# Patient Record
Sex: MALE | Race: WHITE | NOT HISPANIC OR LATINO | Employment: OTHER | URBAN - METROPOLITAN AREA
[De-identification: names, ages, dates, MRNs, and addresses within clinical notes are randomized per-mention and may not be internally consistent; named-entity substitution may affect disease eponyms.]

---

## 2020-04-17 ENCOUNTER — LAB REQUISITION (OUTPATIENT)
Dept: LAB | Facility: HOSPITAL | Age: 75
End: 2020-04-17

## 2020-04-17 DIAGNOSIS — Z00.00 ROUTINE GENERAL MEDICAL EXAMINATION AT A HEALTH CARE FACILITY: ICD-10-CM

## 2020-04-17 LAB
ALBUMIN SERPL-MCNC: 3.8 G/DL (ref 3.5–5.2)
ALBUMIN/GLOB SERPL: 1.1 G/DL
ALP SERPL-CCNC: 69 U/L (ref 39–117)
ALT SERPL W P-5'-P-CCNC: 10 U/L (ref 1–41)
ANION GAP SERPL CALCULATED.3IONS-SCNC: 14 MMOL/L (ref 5–15)
AST SERPL-CCNC: 13 U/L (ref 1–40)
BASOPHILS # BLD AUTO: 0.05 10*3/MM3 (ref 0–0.2)
BASOPHILS NFR BLD AUTO: 0.7 % (ref 0–1.5)
BILIRUB SERPL-MCNC: 1 MG/DL (ref 0.2–1.2)
BUN BLD-MCNC: 25 MG/DL (ref 8–23)
BUN/CREAT SERPL: 23.1 (ref 7–25)
CALCIUM SPEC-SCNC: 9.5 MG/DL (ref 8.6–10.5)
CHLORIDE SERPL-SCNC: 92 MMOL/L (ref 98–107)
CO2 SERPL-SCNC: 25 MMOL/L (ref 22–29)
CREAT BLD-MCNC: 1.08 MG/DL (ref 0.76–1.27)
DEPRECATED RDW RBC AUTO: 44.7 FL (ref 37–54)
EOSINOPHIL # BLD AUTO: 0.05 10*3/MM3 (ref 0–0.4)
EOSINOPHIL NFR BLD AUTO: 0.7 % (ref 0.3–6.2)
ERYTHROCYTE [DISTWIDTH] IN BLOOD BY AUTOMATED COUNT: 13.2 % (ref 12.3–15.4)
GFR SERPL CREATININE-BSD FRML MDRD: 67 ML/MIN/1.73
GFR SERPL CREATININE-BSD FRML MDRD: 81 ML/MIN/1.73
GLOBULIN UR ELPH-MCNC: 3.6 GM/DL
GLUCOSE BLD-MCNC: 175 MG/DL (ref 65–99)
HCT VFR BLD AUTO: 40.6 % (ref 37.5–51)
HGB BLD-MCNC: 12.9 G/DL (ref 13–17.7)
IMM GRANULOCYTES # BLD AUTO: 0.02 10*3/MM3 (ref 0–0.05)
IMM GRANULOCYTES NFR BLD AUTO: 0.3 % (ref 0–0.5)
LYMPHOCYTES # BLD AUTO: 1.08 10*3/MM3 (ref 0.7–3.1)
LYMPHOCYTES NFR BLD AUTO: 14.3 % (ref 19.6–45.3)
MCH RBC QN AUTO: 29.5 PG (ref 26.6–33)
MCHC RBC AUTO-ENTMCNC: 31.8 G/DL (ref 31.5–35.7)
MCV RBC AUTO: 92.9 FL (ref 79–97)
MONOCYTES # BLD AUTO: 0.65 10*3/MM3 (ref 0.1–0.9)
MONOCYTES NFR BLD AUTO: 8.6 % (ref 5–12)
NEUTROPHILS # BLD AUTO: 5.72 10*3/MM3 (ref 1.7–7)
NEUTROPHILS NFR BLD AUTO: 75.4 % (ref 42.7–76)
NRBC BLD AUTO-RTO: 0 /100 WBC (ref 0–0.2)
PLATELET # BLD AUTO: 294 10*3/MM3 (ref 140–450)
PMV BLD AUTO: 9.1 FL (ref 6–12)
POTASSIUM BLD-SCNC: 4.3 MMOL/L (ref 3.5–5.2)
PROT SERPL-MCNC: 7.4 G/DL (ref 6–8.5)
RBC # BLD AUTO: 4.37 10*6/MM3 (ref 4.14–5.8)
SODIUM BLD-SCNC: 131 MMOL/L (ref 136–145)
WBC NRBC COR # BLD: 7.57 10*3/MM3 (ref 3.4–10.8)

## 2020-04-17 PROCEDURE — 80053 COMPREHEN METABOLIC PANEL: CPT

## 2020-04-17 PROCEDURE — 85025 COMPLETE CBC W/AUTO DIFF WBC: CPT

## 2021-07-12 ENCOUNTER — APPOINTMENT (OUTPATIENT)
Dept: CT IMAGING | Facility: HOSPITAL | Age: 76
End: 2021-07-12

## 2021-07-12 ENCOUNTER — HOSPITAL ENCOUNTER (EMERGENCY)
Facility: HOSPITAL | Age: 76
Discharge: HOME | End: 2021-07-12
Attending: EMERGENCY MEDICINE | Admitting: EMERGENCY MEDICINE

## 2021-07-12 ENCOUNTER — APPOINTMENT (OUTPATIENT)
Dept: GENERAL RADIOLOGY | Facility: HOSPITAL | Age: 76
End: 2021-07-12

## 2021-07-12 VITALS
WEIGHT: 300 LBS | DIASTOLIC BLOOD PRESSURE: 55 MMHG | BODY MASS INDEX: 42 KG/M2 | OXYGEN SATURATION: 97 % | SYSTOLIC BLOOD PRESSURE: 133 MMHG | HEIGHT: 71 IN | TEMPERATURE: 98.6 F | HEART RATE: 70 BPM | RESPIRATION RATE: 18 BRPM

## 2021-07-12 DIAGNOSIS — R42 DIZZINESS: Primary | ICD-10-CM

## 2021-07-12 DIAGNOSIS — T07.XXXA ABRASION, MULTIPLE SITES: ICD-10-CM

## 2021-07-12 LAB
ALBUMIN SERPL-MCNC: 3.9 G/DL (ref 3.5–5.2)
ALBUMIN/GLOB SERPL: 1.3 G/DL
ALP SERPL-CCNC: 74 U/L (ref 39–117)
ALT SERPL W P-5'-P-CCNC: 6 U/L (ref 1–41)
ANION GAP SERPL CALCULATED.3IONS-SCNC: 11 MMOL/L (ref 5–15)
AST SERPL-CCNC: 16 U/L (ref 1–40)
BASOPHILS # BLD AUTO: 0.07 10*3/MM3 (ref 0–0.2)
BASOPHILS NFR BLD AUTO: 1.2 % (ref 0–1.5)
BILIRUB SERPL-MCNC: 1 MG/DL (ref 0–1.2)
BILIRUB UR QL STRIP: NEGATIVE
BUN SERPL-MCNC: 26 MG/DL (ref 8–23)
BUN/CREAT SERPL: 26 (ref 7–25)
CALCIUM SPEC-SCNC: 9.1 MG/DL (ref 8.6–10.5)
CHLORIDE SERPL-SCNC: 98 MMOL/L (ref 98–107)
CLARITY UR: CLEAR
CO2 SERPL-SCNC: 22 MMOL/L (ref 22–29)
COLOR UR: YELLOW
CREAT SERPL-MCNC: 1 MG/DL (ref 0.76–1.27)
DEPRECATED RDW RBC AUTO: 42.7 FL (ref 37–54)
EOSINOPHIL # BLD AUTO: 0.2 10*3/MM3 (ref 0–0.4)
EOSINOPHIL NFR BLD AUTO: 3.4 % (ref 0.3–6.2)
ERYTHROCYTE [DISTWIDTH] IN BLOOD BY AUTOMATED COUNT: 12.9 % (ref 12.3–15.4)
GFR SERPL CREATININE-BSD FRML MDRD: 73 ML/MIN/1.73
GLOBULIN UR ELPH-MCNC: 3.1 GM/DL
GLUCOSE SERPL-MCNC: 100 MG/DL (ref 65–99)
GLUCOSE UR STRIP-MCNC: NEGATIVE MG/DL
HCT VFR BLD AUTO: 43.4 % (ref 37.5–51)
HGB BLD-MCNC: 14.4 G/DL (ref 13–17.7)
HGB UR QL STRIP.AUTO: NEGATIVE
HOLD SPECIMEN: NORMAL
IMM GRANULOCYTES # BLD AUTO: 0.01 10*3/MM3 (ref 0–0.05)
IMM GRANULOCYTES NFR BLD AUTO: 0.2 % (ref 0–0.5)
KETONES UR QL STRIP: ABNORMAL
LEUKOCYTE ESTERASE UR QL STRIP.AUTO: NEGATIVE
LYMPHOCYTES # BLD AUTO: 1.48 10*3/MM3 (ref 0.7–3.1)
LYMPHOCYTES NFR BLD AUTO: 25.2 % (ref 19.6–45.3)
MAGNESIUM SERPL-MCNC: 2 MG/DL (ref 1.6–2.4)
MCH RBC QN AUTO: 30.4 PG (ref 26.6–33)
MCHC RBC AUTO-ENTMCNC: 33.2 G/DL (ref 31.5–35.7)
MCV RBC AUTO: 91.6 FL (ref 79–97)
MONOCYTES # BLD AUTO: 0.94 10*3/MM3 (ref 0.1–0.9)
MONOCYTES NFR BLD AUTO: 16 % (ref 5–12)
NEUTROPHILS NFR BLD AUTO: 3.17 10*3/MM3 (ref 1.7–7)
NEUTROPHILS NFR BLD AUTO: 54 % (ref 42.7–76)
NITRITE UR QL STRIP: NEGATIVE
NRBC BLD AUTO-RTO: 0 /100 WBC (ref 0–0.2)
PH UR STRIP.AUTO: <=5 [PH] (ref 5–8)
PLATELET # BLD AUTO: 183 10*3/MM3 (ref 140–450)
PMV BLD AUTO: 9.4 FL (ref 6–12)
POTASSIUM SERPL-SCNC: 4.1 MMOL/L (ref 3.5–5.2)
PROT SERPL-MCNC: 7 G/DL (ref 6–8.5)
PROT UR QL STRIP: NEGATIVE
RBC # BLD AUTO: 4.74 10*6/MM3 (ref 4.14–5.8)
SODIUM SERPL-SCNC: 131 MMOL/L (ref 136–145)
SP GR UR STRIP: 1.02 (ref 1–1.03)
TROPONIN T SERPL-MCNC: <0.01 NG/ML (ref 0–0.03)
UROBILINOGEN UR QL STRIP: ABNORMAL
WBC # BLD AUTO: 5.87 10*3/MM3 (ref 3.4–10.8)
WHOLE BLOOD HOLD SPECIMEN: NORMAL

## 2021-07-12 PROCEDURE — 71045 X-RAY EXAM CHEST 1 VIEW: CPT

## 2021-07-12 PROCEDURE — 93005 ELECTROCARDIOGRAM TRACING: CPT

## 2021-07-12 PROCEDURE — 93005 ELECTROCARDIOGRAM TRACING: CPT | Performed by: EMERGENCY MEDICINE

## 2021-07-12 PROCEDURE — 99284 EMERGENCY DEPT VISIT MOD MDM: CPT

## 2021-07-12 PROCEDURE — 83735 ASSAY OF MAGNESIUM: CPT | Performed by: EMERGENCY MEDICINE

## 2021-07-12 PROCEDURE — 84484 ASSAY OF TROPONIN QUANT: CPT | Performed by: EMERGENCY MEDICINE

## 2021-07-12 PROCEDURE — 80053 COMPREHEN METABOLIC PANEL: CPT | Performed by: EMERGENCY MEDICINE

## 2021-07-12 PROCEDURE — 70450 CT HEAD/BRAIN W/O DYE: CPT

## 2021-07-12 PROCEDURE — 85025 COMPLETE CBC W/AUTO DIFF WBC: CPT | Performed by: EMERGENCY MEDICINE

## 2021-07-12 PROCEDURE — 81003 URINALYSIS AUTO W/O SCOPE: CPT | Performed by: EMERGENCY MEDICINE

## 2021-07-12 RX ORDER — SODIUM CHLORIDE 0.9 % (FLUSH) 0.9 %
10 SYRINGE (ML) INJECTION AS NEEDED
Status: DISCONTINUED | OUTPATIENT
Start: 2021-07-12 | End: 2021-07-12 | Stop reason: HOSPADM

## 2021-07-12 NOTE — ED NOTES
Report to [place] at [facility], [facility] ETA 2015     Bren De La Rosa, RN  07/12/21 1948

## 2021-07-13 LAB
QT INTERVAL: 394 MS
QTC INTERVAL: 445 MS

## 2021-07-13 NOTE — ED PROVIDER NOTES
"Subjective   Pt presents after a fall.  He lives at NH and was walking, his hand slipped off a railing and he fell over.  He has some abrasions to the right elbow and right knee but he denies any pain.  He specifically denies pain to the head, neck, back, arms, legs.  He says he did not want to come but the NH made him come.    He has been having \"balance issues\" and uses a walker at the NH.  He has been getting PT for the issue.    He denies any recent medication changes.  He has a history of dementia, Parkinson's disease and AF and is on Eliquis.      History provided by:  Patient      Review of Systems   Constitutional: Negative for fever.   Respiratory: Negative for cough and shortness of breath.    Cardiovascular: Negative for chest pain.   Gastrointestinal: Negative for abdominal pain.   Musculoskeletal: Negative for back pain and neck pain.   Neurological: Negative for headaches.   All other systems reviewed and are negative.      Past Medical History:   Diagnosis Date   • Dementia (CMS/[facility])    • Parkinson disease (CMS/[facility])        No Known Allergies    No past surgical history on file.    No family history on file.    Social History     Socioeconomic History   • Marital status:      Spouse name: Not on file   • Number of children: Not on file   • Years of education: Not on file   • Highest education level: Not on file   Tobacco Use   • Smoking status: Never Smoker   Substance and Sexual Activity   • Alcohol use: Not Currently   • Drug use: Not Currently           Objective   Physical Exam  Vitals and nursing note reviewed.   Constitutional:       General: He is not in acute distress.     Appearance: Normal appearance. He is not ill-appearing.   HENT:      Head: Normocephalic and atraumatic.      Mouth/Throat:      Mouth: Mucous membranes are moist.   Eyes:      General: No scleral icterus.        Right eye: No discharge.         Left eye: No discharge.      Conjunctiva/sclera: Conjunctivae normal. "   Cardiovascular:      Rate and Rhythm: Normal rate and regular rhythm.      Heart sounds: No murmur heard.     Pulmonary:      Effort: Pulmonary effort is normal. No respiratory distress.      Breath sounds: Normal breath sounds. No wheezing.   Abdominal:      General: Bowel sounds are normal. There is no distension.      Palpations: Abdomen is soft.      Tenderness: There is no abdominal tenderness. There is no guarding or rebound.   Musculoskeletal:         General: No swelling. Normal range of motion.      Cervical back: Normal range of motion and neck supple.      Comments: Abrasion to the right elbow. Normal ROM, no bruising, no laceration, no tenderness or swelling or deformity.    Abrasion to the right knee below patella.  Normal ROM, no swelling, no deformity, no tenderness.    Except as documented there is no tenderness to gross palpation of the arms or legs, and intact painless ROM to the shoulders, elbows, wrists, hips, knees and ankles.     Skin:     General: Skin is warm and dry.      Findings: No rash.   Neurological:      General: No focal deficit present.      Mental Status: He is alert and oriented to person, place, and time. Mental status is at baseline.      Comments: Speech fluent and articulate.  No facial droop.  5/5 strength in arms and legs.  Normal .  Mentation normal.   Psychiatric:         Mood and Affect: Mood normal.         Behavior: Behavior normal.         Thought Content: Thought content normal.         Procedures           ED Course         Chart says he has dementia but he gives a detailed history quite readily, recites facts and dates and talks about his stuation and his family.  If he is confabulating he is fantastic at it.    EKG AF with trigeminy.  Labs benign.  Imaging negative.  Continues to have no complaints.  Patient stable on serial rechecks.  Discussed findings, concerns, plan of care, expected course, reasons to return and followup.  Provided the opportunity to  ask questions.                                    MDM  Number of Diagnoses or Management Options     Amount and/or Complexity of Data Reviewed  Clinical lab tests: ordered and reviewed  Tests in the radiology section of CPT®: ordered and reviewed  Independent visualization of images, tracings, or specimens: yes        Final diagnoses:   Dizziness   Abrasion, multiple sites       ED Disposition  ED Disposition     ED Disposition Condition Comment    Discharge Stable           Xavi Perez MD  [address] [place] [address] DR JONES [address] [place] [address]  [phone]    In 2 days           Medication List      No changes were made to your prescriptions during this visit.          Yaya Calle MD  07/13/21 0040

## 2021-07-13 NOTE — ED NOTES
Pt assisted with urinal; Report to [facility] transport team.     Bren De La Rosa RN  07/12/21 2016

## 2021-08-21 ENCOUNTER — APPOINTMENT (OUTPATIENT)
Dept: MRI IMAGING | Facility: HOSPITAL | Age: 76
End: 2021-08-21

## 2021-08-21 ENCOUNTER — APPOINTMENT (OUTPATIENT)
Dept: GENERAL RADIOLOGY | Facility: HOSPITAL | Age: 76
End: 2021-08-21

## 2021-08-21 ENCOUNTER — APPOINTMENT (OUTPATIENT)
Dept: CT IMAGING | Facility: HOSPITAL | Age: 76
End: 2021-08-21

## 2021-08-21 ENCOUNTER — HOSPITAL ENCOUNTER (EMERGENCY)
Facility: HOSPITAL | Age: 76
Discharge: NURSING, REHAB OR LONG-TERM CARE | End: 2021-08-21
Attending: EMERGENCY MEDICINE | Admitting: EMERGENCY MEDICINE

## 2021-08-21 VITALS
WEIGHT: 315 LBS | HEIGHT: 71 IN | SYSTOLIC BLOOD PRESSURE: 126 MMHG | OXYGEN SATURATION: 95 % | DIASTOLIC BLOOD PRESSURE: 83 MMHG | TEMPERATURE: 97.4 F | HEART RATE: 73 BPM | RESPIRATION RATE: 18 BRPM | BODY MASS INDEX: 44.1 KG/M2

## 2021-08-21 DIAGNOSIS — S09.90XA INJURY OF HEAD, INITIAL ENCOUNTER: ICD-10-CM

## 2021-08-21 DIAGNOSIS — F03.90 DEMENTIA WITHOUT BEHAVIORAL DISTURBANCE, UNSPECIFIED DEMENTIA TYPE: ICD-10-CM

## 2021-08-21 DIAGNOSIS — Z79.01 ANTICOAGULATED: ICD-10-CM
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
## 2021-08-21 DIAGNOSIS — R41.82 ALTERED MENTAL STATUS, UNSPECIFIED ALTERED MENTAL STATUS TYPE: Primary | ICD-10-CM

## 2021-08-21 LAB
ALT SERPL W P-5'-P-CCNC: 5 U/L (ref 1–41)
APTT PPP: 40.1 SECONDS (ref 50–95)
AST SERPL-CCNC: 20 U/L (ref 1–40)
BASE EXCESS BLDA CALC-SCNC: 0 MMOL/L (ref -5–5)
BASOPHILS # BLD AUTO: 0.01 10*3/MM3 (ref 0–0.2)
BASOPHILS NFR BLD AUTO: 0.2 % (ref 0–1.5)
BILIRUB UR QL STRIP: NEGATIVE
CA-I BLDA-SCNC: 1.16 MMOL/L (ref 1.2–1.32)
CLARITY UR: CLEAR
CO2 BLDA-SCNC: 26 MMOL/L (ref 24–29)
COLOR UR: ABNORMAL
CREAT BLDA-MCNC: 0.8 MG/DL (ref 0.6–1.3)
DEPRECATED RDW RBC AUTO: 42.5 FL (ref 37–54)
EOSINOPHIL # BLD AUTO: 0.04 10*3/MM3 (ref 0–0.4)
EOSINOPHIL NFR BLD AUTO: 0.8 % (ref 0.3–6.2)
ERYTHROCYTE [DISTWIDTH] IN BLOOD BY AUTOMATED COUNT: 13 % (ref 12.3–15.4)
GLUCOSE BLDC GLUCOMTR-MCNC: 124 MG/DL (ref 70–130)
GLUCOSE UR STRIP-MCNC: NEGATIVE MG/DL
HCO3 BLDA-SCNC: 24.6 MMOL/L (ref 22–26)
HCT VFR BLD AUTO: 42.2 % (ref 37.5–51)
HCT VFR BLDA CALC: 41 % (ref 38–51)
HGB BLD-MCNC: 14.1 G/DL (ref 13–17.7)
HGB BLDA-MCNC: 13.9 G/DL (ref 12–17)
HGB UR QL STRIP.AUTO: NEGATIVE
HOLD SPECIMEN: NORMAL
IMM GRANULOCYTES # BLD AUTO: 0.01 10*3/MM3 (ref 0–0.05)
IMM GRANULOCYTES NFR BLD AUTO: 0.2 % (ref 0–0.5)
INR PPP: 1.6 (ref 0.8–1.2)
KETONES UR QL STRIP: NEGATIVE
LEUKOCYTE ESTERASE UR QL STRIP.AUTO: NEGATIVE
LYMPHOCYTES # BLD AUTO: 0.89 10*3/MM3 (ref 0.7–3.1)
LYMPHOCYTES NFR BLD AUTO: 18.7 % (ref 19.6–45.3)
MCH RBC QN AUTO: 30 PG (ref 26.6–33)
MCHC RBC AUTO-ENTMCNC: 33.4 G/DL (ref 31.5–35.7)
MCV RBC AUTO: 89.8 FL (ref 79–97)
MONOCYTES # BLD AUTO: 0.58 10*3/MM3 (ref 0.1–0.9)
MONOCYTES NFR BLD AUTO: 12.2 % (ref 5–12)
NEUTROPHILS NFR BLD AUTO: 3.22 10*3/MM3 (ref 1.7–7)
NEUTROPHILS NFR BLD AUTO: 67.9 % (ref 42.7–76)
NITRITE UR QL STRIP: NEGATIVE
NRBC BLD AUTO-RTO: 0 /100 WBC (ref 0–0.2)
PCO2 BLDA: 37.5 MM HG (ref 35–45)
PH BLDA: 7.42 PH UNITS (ref 7.35–7.6)
PH UR STRIP.AUTO: <=5 [PH] (ref 5–8)
PLATELET # BLD AUTO: 153 10*3/MM3 (ref 140–450)
PMV BLD AUTO: 9.5 FL (ref 6–12)
PO2 BLDA: 36 MMHG (ref 80–105)
POTASSIUM BLDA-SCNC: 4 MMOL/L (ref 3.5–4.9)
PROT UR QL STRIP: ABNORMAL
PROTHROMBIN TIME: 18.7 SECONDS (ref 12.8–15.2)
QT INTERVAL: 420 MS
QTC INTERVAL: 440 MS
RBC # BLD AUTO: 4.7 10*6/MM3 (ref 4.14–5.8)
SAO2 % BLDA: 70 % (ref 95–98)
SODIUM BLD-SCNC: 138 MMOL/L (ref 138–146)
SP GR UR STRIP: 1.08 (ref 1–1.03)
TROPONIN T SERPL-MCNC: <0.01 NG/ML (ref 0–0.03)
UROBILINOGEN UR QL STRIP: ABNORMAL
WBC # BLD AUTO: 4.75 10*3/MM3 (ref 3.4–10.8)
WHOLE BLOOD HOLD SPECIMEN: NORMAL

## 2021-08-21 PROCEDURE — 70551 MRI BRAIN STEM W/O DYE: CPT

## 2021-08-21 PROCEDURE — 85730 THROMBOPLASTIN TIME PARTIAL: CPT | Performed by: EMERGENCY MEDICINE

## 2021-08-21 PROCEDURE — 99284 EMERGENCY DEPT VISIT MOD MDM: CPT

## 2021-08-21 PROCEDURE — 85025 COMPLETE CBC W/AUTO DIFF WBC: CPT | Performed by: EMERGENCY MEDICINE

## 2021-08-21 PROCEDURE — 93005 ELECTROCARDIOGRAM TRACING: CPT | Performed by: EMERGENCY MEDICINE

## 2021-08-21 PROCEDURE — 0042T HC CT CEREBRAL PERFUSION W/WO CONTRAST: CPT

## 2021-08-21 PROCEDURE — 85014 HEMATOCRIT: CPT

## 2021-08-21 PROCEDURE — 70496 CT ANGIOGRAPHY HEAD: CPT

## 2021-08-21 PROCEDURE — 70498 CT ANGIOGRAPHY NECK: CPT

## 2021-08-21 PROCEDURE — 70450 CT HEAD/BRAIN W/O DYE: CPT

## 2021-08-21 PROCEDURE — 84132 ASSAY OF SERUM POTASSIUM: CPT

## 2021-08-21 PROCEDURE — 71045 X-RAY EXAM CHEST 1 VIEW: CPT

## 2021-08-21 PROCEDURE — 82330 ASSAY OF CALCIUM: CPT

## 2021-08-21 PROCEDURE — 84295 ASSAY OF SERUM SODIUM: CPT

## 2021-08-21 PROCEDURE — 99285 EMERGENCY DEPT VISIT HI MDM: CPT

## 2021-08-21 PROCEDURE — 84460 ALANINE AMINO (ALT) (SGPT): CPT | Performed by: EMERGENCY MEDICINE

## 2021-08-21 PROCEDURE — 82565 ASSAY OF CREATININE: CPT

## 2021-08-21 PROCEDURE — 82947 ASSAY GLUCOSE BLOOD QUANT: CPT

## 2021-08-21 PROCEDURE — 84484 ASSAY OF TROPONIN QUANT: CPT | Performed by: EMERGENCY MEDICINE

## 2021-08-21 PROCEDURE — 81003 URINALYSIS AUTO W/O SCOPE: CPT | Performed by: EMERGENCY MEDICINE

## 2021-08-21 PROCEDURE — 82803 BLOOD GASES ANY COMBINATION: CPT

## 2021-08-21 PROCEDURE — 85610 PROTHROMBIN TIME: CPT

## 2021-08-21 PROCEDURE — 84450 TRANSFERASE (AST) (SGOT): CPT | Performed by: EMERGENCY MEDICINE

## 2021-08-21 PROCEDURE — 0 IOPAMIDOL PER 1 ML: Performed by: EMERGENCY MEDICINE

## 2021-08-21 RX ORDER — MEMANTINE HYDROCHLORIDE 10 MG/1
10 TABLET ORAL 2 TIMES DAILY
COMMUNITY

## 2021-08-21 RX ORDER — METOPROLOL SUCCINATE 25 MG/1
25 TABLET, EXTENDED RELEASE ORAL DAILY
COMMUNITY

## 2021-08-21 RX ORDER — CHOLECALCIFEROL (VITAMIN D3) 125 MCG
5 CAPSULE ORAL
COMMUNITY

## 2021-08-21 RX ORDER — ESCITALOPRAM OXALATE 5 MG/1
5 TABLET ORAL DAILY
COMMUNITY

## 2021-08-21 RX ORDER — DOCUSATE SODIUM 100 MG/1
100 CAPSULE, LIQUID FILLED ORAL 2 TIMES DAILY
COMMUNITY

## 2021-08-21 RX ORDER — ASPIRIN 81 MG/1
81 TABLET, CHEWABLE ORAL DAILY
COMMUNITY

## 2021-08-21 RX ORDER — SODIUM CHLORIDE 0.9 % (FLUSH) 0.9 %
10 SYRINGE (ML) INJECTION AS NEEDED
Status: DISCONTINUED | OUTPATIENT
Start: 2021-08-21 | End: 2021-08-21 | Stop reason: HOSPADM

## 2021-08-21 RX ORDER — BISACODYL 10 MG
10 SUPPOSITORY, RECTAL RECTAL DAILY
COMMUNITY

## 2021-08-21 RX ORDER — ACETAMINOPHEN 325 MG/1
650 TABLET ORAL EVERY 6 HOURS PRN
COMMUNITY

## 2021-08-21 RX ORDER — LOVASTATIN 20 MG/1
20 TABLET ORAL NIGHTLY
COMMUNITY

## 2021-08-21 RX ORDER — FINASTERIDE 5 MG/1
5 TABLET, FILM COATED ORAL DAILY
COMMUNITY

## 2021-08-21 RX ADMIN — IOPAMIDOL 115 ML: 755 INJECTION, SOLUTION INTRAVENOUS at 11:20

## 2021-08-21 NOTE — CONSULTS
Stroke Consult Note    Patient Name: Kalpesh Orozco   MRN: [id number]  Age: 76 y.o.  Sex: male  : 1945    Primary Care Physician: Xavi Perez MD  Referring Physician: Dr. Boston Lara    TIME STROKE TEAM CALLED: 1052 EST     TIME PATIENT SEEN: 1103 EST    Handedness: Right  Race:     Chief Complaint/Reason for Consultation: Increased confusion, fall, and dysarthria    Subjective .  HPI: Mr. Orozco is a 76-year-old right-handed  male with known medical diagnosis of hypertension, hyperlipidemia, Lewy body dementia, Parkinson's disease, atrial fibrillation (on chronic Eliquis), anxiety/depression, and obstructive sleep apnea who presents to our facility via EMS from [facility] after sustaining a fall this morning.  Staff reports that they noticed the patient was increasingly confused yesterday however his significant other did not want him sent to the hospital for further evaluation.  When he sustained his fall today they contacted her and she wanted him transferred to our facility for further evaluation.  At baseline staff says that he is independent and can walk with or without a walking device, is able to dress himself, and feed himself.  They state that he is typically able to answer questions appropriately however was unable to do so this morning.  EMS states that the patient's blood pressure upon arrival was 96/50 and in route was 100/70.  In the emergency department his blood pressure is currently 116/73.    Last Known Normal Date/Time: 2021 unknown onset EST     Review of Systems   Unable to perform ROS: Mental status change      Past Medical History:   Diagnosis Date   • Anxiety    • Atrial fibrillation (CMS/HCC)    • Dementia (CMS/HCC)    • Depression    • GERD (gastroesophageal reflux disease)    • Hyperlipidemia    • Hypertension    • Lewy body dementia (CMS/HCC)    • JOHANN (obstructive sleep apnea)    • Parkinson disease (CMS/HCC)      No past surgical history  on file.  No family history on file.  Social History     Socioeconomic History   • Marital status:      Spouse name: Not on file   • Number of children: Not on file   • Years of education: Not on file   • Highest education level: Not on file   Tobacco Use   • Smoking status: Never Smoker   Substance and Sexual Activity   • Alcohol use: Not Currently   • Drug use: Not Currently     No Known Allergies  Prior to Admission medications    Medication Sig Start Date End Date Taking? Authorizing Provider   acetaminophen (TYLENOL) 325 MG tablet Take 650 mg by mouth Every 6 (Six) Hours As Needed for Mild Pain .   Yes Provider, MD Kristin         Objective     Temp:  [97.4 °F (36.3 °C)] 97.4 °F (36.3 °C)  Heart Rate:  [69-74] 73  Resp:  [18] 18  BP: (116-131)/(66-90) 122/66  Neurological Exam  Mental Status  Alert. Oriented only to person and place. Mild dysarthria present. Language is fluent with no aphasia. Attention and concentration are normal.    Cranial Nerves  CN II: Visual fields full to confrontation. Difficult to assess secondary to patient's altered mental status however patient does blink to visual threat in all fields.  CN III, IV, VI: Extraocular movements intact bilaterally. Pupils equal round and reactive to light bilaterally.  CN V: Facial sensation is normal.  CN VII: Full and symmetric facial movement.  CN VIII: Hearing intact bilaterally.  CN IX, X: Palate elevates symmetrically  CN XI: Shoulder shrug strength is normal.  CN XII: Tongue midline without atrophy or fasciculations.    Motor  Normal muscle bulk throughout. No fasciculations present. Normal muscle tone.  Mild bilateral lower extremity weakness (L>R) with 4/5 strength.  Lateral upper extremities without drift and 5/5 strength..    Sensory  Light touch is normal in upper and lower extremities.     Coordination  No obvious dysmetria.    Gait  Not assessed.      Physical Exam  Constitutional:       General: He is not in acute distress.      Appearance: Normal appearance. He is not ill-appearing.   HENT:      Head: Normocephalic.      Mouth/Throat:      Mouth: Mucous membranes are moist.      Pharynx: Oropharynx is clear.   Eyes:      Extraocular Movements: Extraocular movements intact.      Conjunctiva/sclera: Conjunctivae normal.      Pupils: Pupils are equal, round, and reactive to light.   Cardiovascular:      Rate and Rhythm: Normal rate and regular rhythm.   Pulmonary:      Effort: Pulmonary effort is normal.      Comments: On room air  Skin:     General: Skin is warm and dry.   Neurological:      Mental Status: He is alert. He is disoriented.      Cranial Nerves: Dysarthria present. No cranial nerve deficit.      Sensory: No sensory deficit.      Motor: Weakness present.      Coordination: Coordination normal.   Psychiatric:         Mood and Affect: Mood normal.         Behavior: Behavior normal.       Acute Stroke Data    Alteplase (tPA) Inclusion / Exclusion Criteria    Time: 15:06 EDT  Person Administering Scale: SUKI Mc    Inclusion Criteria  [x]   18 years of age or greater   []   Onset of symptoms < 4.5 hours before beginning treatment (stroke onset = time patient was last seen well or without symptoms).   []   Diagnosis of acute ischemic stroke causing measurable disabling deficit (Complete Hemianopia, Any Aphasia, Visual or Sensory Extinction, Any weakness limiting sustained effort against gravity)   []   Any remaining deficit considered potentially disabling in view of patient and practitioner   Exclusion criteria (Do not proceed with Alteplase if any are checked under exclusion criteria)  [x]   Onset unknown or GREATER than 4.5 hours   []   ICH on CT/MRI   []   CT demonstrates hypodensity representing acute or subacute infarct   []   Significant head trauma or prior stroke in the previous 3 months   []   Symptoms suggestive of subarachnoid hemorrhage   []   History of un-ruptured intracranial aneurysm GREATER than  10 mm   []   Recent intracranial or intraspinal surgery within the last 3 months   []   Arterial puncture at a non-compressible site in the previous 7 days   []   Active internal bleeding   []   Acute bleeding tendency   []   Platelet count LESS than 100,000 for known hematological diseases such as leukemia, thrombocytopenia or chronic cirrhosis   [x]   Current use of anticoagulant with INR GREATER than 1.7 or PT GREATER than 15 seconds, aPTT GREATER than 40 seconds   []   Heparin received within 48 hours, resulting in abnormally elevated aPTT GREATER than upper limit of normal   []   Current use of direct thrombin inhibitors or direct factor Xa inhibitors in the past 48 hours   []   Elevated blood pressure refractory to treatment (systolic GREATER than 185 mm/Hg or diastolic  GREATER than 110 mm/Hg   []   Suspected infective endocarditis and aortic arch dissection   []   Current use of therapeutic treatment dose of low-molecular-weight heparin (LMWH) within the previous 24 hours   []   Structural GI malignancy or bleed   Relative exclusion for all patients  []   Only minor non-disabling symptoms   []   Pregnancy   []   Seizure at onset with postictal residual neurological impairments   []   Major surgery or previous trauma within past 14 days   []   History of previous spontaneous ICH, intracranial neoplasm, or AV malformation   []   Postpartum (within previous 14 days)   []   Recent GI or urinary tract hemorrhage (within previous 21 days)   []   Recent acute MI (within previous 3 months)   []   History of un-ruptured intracranial aneurysm LESS than 10 mm   []   History of ruptured intracranial aneurysm   []   Blood glucose LESS than 50 mg/dL (2.7 mmol/L)   []   Dural puncture within the last 7 days   []   Known GREATER than 10 cerebral microbleeds   Additional exclusions for patients with symptoms onset between 3 and 4.5 hours.  []   Age > 80.   []   On any anticoagulants regardless of INR  >>> Warfarin  (Coumadin), Heparin, Enoxaparin (Lovenox), fondaparinux (Arixtra), bivalirudin (Angiomax), Argatroban, dabigatran (Pradaxa), rivaroxaban (Xarelto), or apixaban (Eliquis)   []   Severe stroke (NIHSS > 25).   []   History of BOTH diabetes and previous ischemic stroke.   []   The risks and benefits have been discussed with the patient or family related to the administration of IV Alteplase for stroke symptoms.   []   I have discussed and reviewed the patient's case and imaging with the attending prior to IV Alteplase.   N/A Time Alteplase administered       Hospital Meds:  Scheduled-    Infusions-     PRNs- •  sodium chloride    Functional Status Prior to Current Stroke/Iota Score: 3-4    NIH Stroke Scale  Time: 15:06 EDT  Person Administering Scale: SUKI Mc    Interval: baseline  1a. Level of Consciousness: 0-->Alert, keenly responsive  1b. LOC Questions: 2-->Answers neither question correctly  1c. LOC Commands: 0-->Performs both tasks correctly  2. Best Gaze: 0-->Normal  3. Visual: 0-->No visual loss  4. Facial Palsy: 0-->Normal symmetrical movements  5a. Motor Arm, Left: 0-->No drift, limb holds 90 (or 45) degrees for full 10 secs  5b. Motor Arm, Right: 0-->No drift, limb holds 90 (or 45) degrees for full 10 secs  6a. Motor Leg, Left: 0-->No drift, leg holds 30 degree position for full 5 secs  6b. Motor Leg, Right: 0-->No drift, leg holds 30 degree position for full 5 secs  7. Limb Ataxia: 0-->Absent  8. Sensory: 0-->Normal, no sensory loss  9. Best Language: 0-->No aphasia, normal  10. Dysarthria: 1-->Mild-to-moderate dysarthria, patient slurs at least some words and, at worst, can be understood with some difficulty  11. Extinction and Inattention (formerly Neglect): 0-->No abnormality    Total (NIH Stroke Scale): 3    Results Reviewed:  I have personally reviewed current lab, radiology, and data and agree with results.    CT of the head without contrast is negative for hemorrhage and/or  acute process.    CTA head/neck is negative for flow-limiting stenosis or large vessel occlusion.  The patient does have some mild bilateral carotid atherosclerosis as well as intracranial atherosclerosis.    CT perfusion is negative for large vessel occlusive stroke.        Assessment/Plan:      Altered mental status  Fall with trauma to head  Atrial fibrillation  Lewy body dementia  Parkinson's disease  Hypertension  Hyperlipidemia    This is a 76-year-old right-handed  male presented to the emergency department via EMS after sustaining a fall with trauma to his head.  Nursing staff notes that the patient has been increasingly confused since yesterday afternoon however family initially refused transfer to the hospital.  After his fall today they noted that he had dysarthria prompting them to call EMS to bring him to our facility for further evaluation.    Patient was examined with Dr. Lara in the CT scanner.  Discussed patient and exam with Dr. Tamez.  We will obtain MRI of the brain without contrast to further evaluate for stroke.  If this is negative the patient can be sent back to his nursing facility today.  Discussed recommendations with Dr. Lara who is in agreement.    Dr. Lara has also ordered routine labs as well as urinalysis to further evaluate for potential causes for the patient's increased confusion.    Will follow-up on MRI results.    Discussed plan of care with patient's spouse via telephone who is in agreement with plan.  She has no further questions at this time.    Alma Israel, SUKI  August 21, 2021  11:35 EDT    Addendum 1442: MRI of the brain without contrast was reviewed.  There is no evidence of acute infarct.

## 2021-08-21 NOTE — DISCHARGE INSTRUCTIONS
Increase fluid intake.    Fall precautions.    Return if worse.    Please review the medications you are supposed to be taking according to prior physician recommendations. I have not changed your home medications during this visit. If your discharge instructions indicate that I have changed your home medications, this is not the case, and you should disregard. If you have any questions about the medication you should be taking at home, please call your physician.

## 2021-08-21 NOTE — ED PROVIDER NOTES
EMERGENCY DEPARTMENT ENCOUNTER    Pt Name: Kalpesh Orozco  MRN: [id number]  Pt :   1945  Room Number:    Date of encounter:  2021  PCP: Xavi Perez MD  ED Provider: Boston Lara MD    Historian: Paramedics      HPI:  Chief Complaint: Altered mental status, slurred speech        Context: Kalpesh Orozco is a 76 y.o. male who presents to the ED c/o increased confusion and slurred speech with fall and head injury this morning.  Starting yesterday at some point the patient has been somewhat more confused than at baseline.  Today the patient had a fall to the floor.  Paramedics report this was unwitnessed but no loss of consciousness was visualized by staff members.  The patient speech has been somewhat more slurred than at baseline.  His confusion seems to wax and wane.  As far as they know he has had no fevers or chills.  The patient is a very poor historian and unable to give a detailed account of recent events.        PAST MEDICAL HISTORY  Past Medical History:   Diagnosis Date   • Anxiety    • Atrial fibrillation (CMS/HCC)    • Dementia (CMS/HCC)    • Depression    • GERD (gastroesophageal reflux disease)    • Hyperlipidemia    • Hypertension    • Lewy body dementia (CMS/HCC)    • JOHANN (obstructive sleep apnea)    • Parkinson disease (CMS/HCC)          PAST SURGICAL HISTORY  No past surgical history on file.      FAMILY HISTORY  No family history on file.      SOCIAL HISTORY  Social History     Socioeconomic History   • Marital status:      Spouse name: Not on file   • Number of children: Not on file   • Years of education: Not on file   • Highest education level: Not on file   Tobacco Use   • Smoking status: Never Smoker   Substance and Sexual Activity   • Alcohol use: Not Currently   • Drug use: Not Currently         ALLERGIES  Patient has no known allergies.        REVIEW OF SYSTEMS  Review of Systems       Unable secondary to altered mental status.      PHYSICAL EXAM    I have reviewed  the triage vital signs and nursing notes.    ED Triage Vitals   Temp Pulse Resp BP SpO2   -- -- -- -- --      Temp src Heart Rate Source Patient Position BP Location FiO2 (%)   -- -- -- -- --       Physical Exam  GENERAL:   Appears somewhat agitated but nontoxic  HENT: Nares patent.  EYES: No scleral icterus.  CV: Regular rhythm, regular rate.  No murmurs gallops rubs  RESPIRATORY: Normal effort.  No audible wheezes, rales or rhonchi.  Clear to auscultation no definite evidence of craniofacial trauma.  ABDOMEN: Soft, nontender  MUSCULOSKELETAL: No deformities.   NEURO: Alert, moves all extremities, follows commands but is inconsistent with following these commands.  Visual fields are inconsistent as well.  Patient is confused about at least some of our testing.  See stroke navigator note for formal NIH but note that the patient is nonlateralizing.  SKIN: Warm, dry, no rash visualized.        LAB RESULTS  Recent Results (from the past 24 hour(s))   POC Surgery Labs    Collection Time: 08/21/21 11:11 AM    Specimen: Blood   Result Value Ref Range    Ionized Calcium 1.16 (L) 1.20 - 1.32 mmol/L    POC Potassium 4.0 3.5 - 4.9 mmol/L    Sodium 138 138 - 146 mmol/L    Total CO2 26 24 - 29 mmol/L    Hemoglobin 13.9 12.0 - 17.0 g/dL    Hematocrit 41 38 - 51 %    pCO2, Arterial 37.5 35 - 45 mm Hg    pO2, Arterial 36 (L) 80 - 105 mmHg    Base Excess 0.0000 -5 - 5 mmol/L    O2 Saturation, Arterial 70 (L) 95 - 98 %    pH, Arterial 7.42 7.35 - 7.6 pH units    HCO3, Arterial 24.6 22 - 26 mmol/L    Glucose 124 70 - 130 mg/dL   POC Creatinine    Collection Time: 08/21/21 11:11 AM    Specimen: Blood   Result Value Ref Range    Creatinine 0.80 0.60 - 1.30 mg/dL   Troponin    Collection Time: 08/21/21 11:14 AM    Specimen: Blood   Result Value Ref Range    Troponin T <0.010 0.000 - 0.030 ng/mL   aPTT    Collection Time: 08/21/21 11:14 AM    Specimen: Blood   Result Value Ref Range    PTT 40.1 (L) 50.0 - 95.0 seconds   AST    Collection  Time: 08/21/21 11:14 AM    Specimen: Blood   Result Value Ref Range    AST (SGOT) 20 1 - 40 U/L   ALT    Collection Time: 08/21/21 11:14 AM    Specimen: Blood   Result Value Ref Range    ALT (SGPT) 5 1 - 41 U/L   Green Top (Gel)    Collection Time: 08/21/21 11:14 AM   Result Value Ref Range    Extra Tube Hold for add-ons.    Lavender Top    Collection Time: 08/21/21 11:14 AM   Result Value Ref Range    Extra Tube hold for add-on    Gold Top - SST    Collection Time: 08/21/21 11:14 AM   Result Value Ref Range    Extra Tube Hold for add-ons.    Gray Top    Collection Time: 08/21/21 11:14 AM   Result Value Ref Range    Extra Tube Hold for add-ons.    CBC Auto Differential    Collection Time: 08/21/21 11:14 AM    Specimen: Blood   Result Value Ref Range    WBC 4.75 3.40 - 10.80 10*3/mm3    RBC 4.70 4.14 - 5.80 10*6/mm3    Hemoglobin 14.1 13.0 - 17.7 g/dL    Hematocrit 42.2 37.5 - 51.0 %    MCV 89.8 79.0 - 97.0 fL    MCH 30.0 26.6 - 33.0 pg    MCHC 33.4 31.5 - 35.7 g/dL    RDW 13.0 12.3 - 15.4 %    RDW-SD 42.5 37.0 - 54.0 fl    MPV 9.5 6.0 - 12.0 fL    Platelets 153 140 - 450 10*3/mm3    Neutrophil % 67.9 42.7 - 76.0 %    Lymphocyte % 18.7 (L) 19.6 - 45.3 %    Monocyte % 12.2 (H) 5.0 - 12.0 %    Eosinophil % 0.8 0.3 - 6.2 %    Basophil % 0.2 0.0 - 1.5 %    Immature Grans % 0.2 0.0 - 0.5 %    Neutrophils, Absolute 3.22 1.70 - 7.00 10*3/mm3    Lymphocytes, Absolute 0.89 0.70 - 3.10 10*3/mm3    Monocytes, Absolute 0.58 0.10 - 0.90 10*3/mm3    Eosinophils, Absolute 0.04 0.00 - 0.40 10*3/mm3    Basophils, Absolute 0.01 0.00 - 0.20 10*3/mm3    Immature Grans, Absolute 0.01 0.00 - 0.05 10*3/mm3    nRBC 0.0 0.0 - 0.2 /100 WBC   POC Protime / INR    Collection Time: 08/21/21 11:14 AM    Specimen: Blood   Result Value Ref Range    Protime 18.7 (H) 12.8 - 15.2 seconds    INR 1.6 (H) 0.8 - 1.2   ECG 12 Lead    Collection Time: 08/21/21 11:31 AM   Result Value Ref Range    QT Interval 420 ms    QTC Interval 440 ms   Urinalysis With  Culture If Indicated - Urine, Clean Catch    Collection Time: 08/21/21 12:19 PM    Specimen: Urine, Clean Catch   Result Value Ref Range    Color, UA Dark Yellow (A) Yellow, Straw    Appearance, UA Clear Clear    pH, UA <=5.0 5.0 - 8.0    Specific Gravity, UA 1.077 (H) 1.001 - 1.030    Glucose, UA Negative Negative    Ketones, UA Negative Negative    Bilirubin, UA Negative Negative    Blood, UA Negative Negative    Protein, UA Trace (A) Negative    Leuk Esterase, UA Negative Negative    Nitrite, UA Negative Negative    Urobilinogen, UA 4.0 E.U./dL (A) 0.2 - 1.0 E.U./dL       If labs were ordered, I independently reviewed the results.        RADIOLOGY  MRI Brain Without Contrast    Result Date: 8/21/2021  EXAMINATION: MRI BRAIN WO CONTRAST-  INDICATION: Increased confusion, dysarthria, bilateral lower extremity weakness L>r  TECHNIQUE: Multiplanar multisequence MRI of the brain performed without IV contrast  COMPARISON: NONE  FINDINGS: No acute infarct noted on diffusion weighted sequences. Midline structures are unremarkable and the craniocervical junction is satisfactory in appearance. Age-related changes of the brain are noted with volume loss and typical T2 hyperintense periventricular white matter sequela of chronic small vessel ischemia. There is otherwise no evidence of intracranial hemorrhage, mass or mass effect. The ventricles are normal in size and configuration. The orbits are unremarkable and the paranasal sinuses are grossly clear.      Age-related changes of the brain as above, otherwise without evidence of acute infarct, hemorrhage, mass or mass effect.   This report was finalized on 8/21/2021 3:23 PM by Melvin Bains.      XR Chest 1 View    Result Date: 8/21/2021  EXAMINATION: XR CHEST 1 VW-  INDICATION: Acute Stroke Protocol (onset < 12 hrs)  COMPARISON: 7/12/2021  FINDINGS: Stable appearance of the chest from comparison with diffuse interstitial prominence likely reflecting minimal interstitial  edema or at minimum pulmonary vascular engorgement. There is no enlarging effusion or distinct pneumothorax. There is no new focal consolidation. Unchanged cardiac enlargement.      Stable appearance of the chest from comparison with diffuse interstitial prominence likely reflecting minimal interstitial edema or at minimum pulmonary vascular engorgement. There is no enlarging effusion or distinct pneumothorax. There is no new focal consolidation. Unchanged cardiac enlargement.  This report was finalized on 8/21/2021 1:03 PM by Melvin Bains.      CT Head Without Contrast Stroke Protocol    Result Date: 8/21/2021  EXAMINATION: CT HEAD WO CONTRAST STROKE PROTOCOL-  INDICATION: R/O STROKE  TECHNIQUE: Axial noncontrast CT of the head with multiplanar reconstruction  The radiation dose reduction device was turned on for each scan per the ALARA (As Low as Reasonably Achievable) protocol.  COMPARISON: NONE  FINDINGS: Gray-white differentiation is maintained and there is no evidence of intracranial hemorrhage, mass or mass effect. Age-related changes of the brain are present including volume loss and typical periventricular sequela of chronic small vessel ischemia. There is otherwise no evidence of intracranial hemorrhage, mass or mass effect. The ventricles are normal in size and configuration accounting for surrounding volume loss. The orbits are normal and the paranasal sinuses are grossly clear.      Age-related changes of the brain as above, otherwise without evidence of acute intracranial abnormality.  Scan performed at 11:01 AM 8/21/2021, results discussed with the stroke team by Melvin Bains via phone at 11:39 AM same day  This report was finalized on 8/21/2021 11:42 AM by Melvin Bains.      CT Angiogram Head w AI Analysis of LVO, CT Angiogram Neck    Result Date: 8/21/2021  EXAMINATION: CT ANGIOGRAM HEAD W AI ANALYSIS OF LVO-, CT ANGIOGRAM NECK-   INDICATION: R/O STROKE  TECHNIQUE: Axial IV arterial  phase contrast-enhanced CT angiogram of the head and neck. Three-dimensional reconstructions were postprocessed.  The radiation dose reduction device was turned on for each scan per the ALARA (As Low as Reasonably Achievable) protocol.  AI analysis of LVO was utilized.  COMPARISON: NONE  FINDINGS: The lung apices are grossly clear. Evaluation of the neck soft tissues demonstrates no pathologic cervical adenopathy or unexpected soft tissue neck mass. The osseous structures demonstrate no evidence of fracture or malalignment. Patent aortic arch with great vessel origins partially obscured. On the right, there is atherosclerotic change with minimal, less than 25% narrowing of the ICA origin by NASCET criteria. Somewhat more prominent atherosclerosis is present on the left with moderate, around 50% narrowing present. The vertebral arteries are normal in course and caliber bilaterally, mildly diminutive on the left. Intracranially, the carotid siphons demonstrate moderate calcific atherosclerotic change with some mild-to-moderate narrowing of the supraclinoid segments bilaterally. The right A1 segment BAYRON is hypoplastic or occluded. The distal right BAYRON branches fill via a widely patent anterior communicating artery. The remainder of the branching [place] vessels demonstrates some mild multifocal atherosclerotic change, without significant resultant flow-limiting stenosis. The vertebrobasilar system is unremarkable. There are prominent bilateral posterior communicating arteries.      Bilateral ICA atherosclerotic change with around 25% narrowing on the right and 50% narrowing on the left.  Mild intracranial atherosclerotic disease without evidence of resultant high-grade flow limiting stenosis or large vessel occlusion.   This report was finalized on 8/21/2021 11:59 AM by Melvin Bains.      CT CEREBRAL PERFUSION WITH & WITHOUT CONTRAST    Result Date: 8/21/2021  EXAMINATION: CT CEREBRAL PERFUSION W WO  CONTRAST-  INDICATION: R/O STROKE  TECHNIQUE: Cerebral perfusion analysis was performed using computed tomography with contrast administration, including post processing of parametric maps with determination of cerebral blood flow, cerebral blood volume, and mean transit time.  The radiation dose reduction device was turned on for each scan per the ALARA (As Low as Reasonably Achievable) protocol.  COMPARISON: Noncontrast CT head performed concurrently  FINDINGS: Perfusion maps demonstrate relative symmetry involving cerebral blood flow and cerebral blood volume, without definite evidence of core infarct or large area of ischemic tissue at risk.      Relatively symmetric perfusion with no evidence of core infarct or ischemic tissue at risk  This report was finalized on 8/21/2021 12:11 PM by Melvin Bains.        I ordered and reviewed the above noted radiographic studies.      I viewed images of head CT which showed no acute bleed per my independent interpretation.    See radiologist's dictation for official interpretation.        PROCEDURES    Procedures    ECG 12 Lead   Final Result   Test Reason : Acute Stroke Protocol (onset < 12 hrs)   Blood Pressure :   */*   mmHG   Vent. Rate :  66 BPM     Atrial Rate : 277 BPM      P-R Int :   * ms          QRS Dur : 100 ms       QT Int : 420 ms       P-R-T Axes :   *   2  27 degrees      QTc Int : 440 ms      Atrial fibrillation with premature ventricular or aberrantly conducted    complexes   Cannot rule out Inferior infarct (cited on or before 12-JUL-2021)   Abnormal ECG   When compared with ECG of 12-JUL-2021 16:55,   Previous ECG has undetermined rhythm, needs review   Confirmed by PETER CARRIZALES MD ([id number]) on 8/21/2021 3:34:44 PM      Referred By: EDMD           Confirmed By: PETER CARRIZALES MD          MEDICATIONS GIVEN IN ER    Medications   sodium chloride 0.9 % flush 10 mL (has no administration in time range)   iopamidol (ISOVUE-370) 76 % injection 150 mL (115 mL  Intravenous Given 8/21/21 1120)         PROGRESS, DATA ANALYSIS, CONSULTS, AND MEDICAL DECISION MAKING    All labs have been independently reviewed by me.  All radiology studies have been reviewed by me and the radiologist dictating the report.   EKG's have been independently viewed and interpreted by me.      Differential diagnoses: Acute CVA versus ICH versus occult infection, etc.      ED Course as of Aug 21 1731   Sat Aug 21, 2021   1143 I evaluated the patient emergently under code stroke protocol in our CT scanner.  I spoke with the paramedics who brought the patient in.  I discussed the case with our stroke navigator Alma.  She has spoken with nursing home staff.  At this point the most appropriate course of action will be an MRI.  Anticipate returning patient to long-term care facility if negative    [MS]      ED Course User Index  [MS] Boston Lara MD             AS OF 17:31 EDT VITALS:    BP - 122/66  HR - 73  TEMP - 97.4 °F (36.3 °C) (Oral)  O2 SATS - 95%        DIAGNOSIS  Final diagnoses:   Altered mental status, unspecified altered mental status type   Dementia without behavioral disturbance, unspecified dementia type (CMS/[facility])   Fall, initial encounter   Injury of head, initial encounter   Anticoagulated         DISPOSITION  DISCHARGE    FOLLOW-UP  Xavi Perez MD  [address] [place] [id number]  [phone]      NEXT AVAILABLE APPOINTMENT - RECHECK OF CONDITION    [facility] Emergency Department  [address] [facility] [address] [phone]    IF YOU HAVE ANY CONCERN OF WORSENING CONDITION         Medication List      No changes were made to your prescriptions during this visit.                  Boston Lara MD  08/21/21 1731